# Patient Record
Sex: FEMALE | Race: WHITE
[De-identification: names, ages, dates, MRNs, and addresses within clinical notes are randomized per-mention and may not be internally consistent; named-entity substitution may affect disease eponyms.]

---

## 2018-07-25 ENCOUNTER — HOSPITAL ENCOUNTER (EMERGENCY)
Dept: HOSPITAL 65 - ER | Age: 33
Discharge: HOME | End: 2018-07-25
Payer: COMMERCIAL

## 2018-07-25 VITALS — BODY MASS INDEX: 46.65 KG/M2 | HEIGHT: 65 IN | WEIGHT: 280 LBS

## 2018-07-25 VITALS — SYSTOLIC BLOOD PRESSURE: 135 MMHG | DIASTOLIC BLOOD PRESSURE: 90 MMHG

## 2018-07-25 VITALS — DIASTOLIC BLOOD PRESSURE: 78 MMHG | SYSTOLIC BLOOD PRESSURE: 122 MMHG

## 2018-07-25 DIAGNOSIS — K52.9: Primary | ICD-10-CM

## 2018-07-25 DIAGNOSIS — E07.9: ICD-10-CM

## 2018-07-25 DIAGNOSIS — Z79.899: ICD-10-CM

## 2018-07-25 PROCEDURE — 99283 EMERGENCY DEPT VISIT LOW MDM: CPT

## 2018-07-25 NOTE — ER.PDOC
General


Chief Complaint:  Nausea,Vomiting,Diarrhea


Stated Complaint:  N/V/D


Time seen by MD:  12:47


Source:  patient


Exam Limitations:  no limitations





History of Present Illness


Initial Comments


Nausea/vomiting/diarrhea this morning. Diarrhea x4 with occasional abdominal 

cramps.


Severity/Quality:  mild, cramping


Associated Symptoms (vomiting):  mild vomiting


Associated Symptoms (diarrhea):  watery


Allergies:  


Coded Allergies:  


     No Known Allergies (Unverified , 16)


Home Meds


Active Scripts


Hydrocodone Bit/Acetaminophen (NORCO 7.5-325 TABLET) 7.5-3,251 Ea Tablet, 2 

EACH PO Q4H PRN for Pain 7-10 when tolerating PO, #60 TABLET 0 Refills


   Prov:SPRING MCDUFFIE MD         16


Reported Medications


Pantoprazole Sodium (PROTONIX) 40 Mg Tablet.dr, 1 TAB PO DAILY, #30 TAB 5 

Refills


   11/8/15


Levothyroxine Sodium (SYNTHROID) 175 Mcg Tablet, 175 MCG PO DAILY for 

HYPOTHYROID, #30 TABLET 1 Refill


   14


Vital Signs





First Vital Signs








  Date Time  Temp Pulse Resp B/P (MAP) Pulse Ox O2 Delivery O2 Flow Rate FiO2


 


18 12:30 97.6 87 17  100 Room Air  





 97.6       


 


18 12:33    135/90 (105)    








Last Vital Signs








  Date Time  Temp Pulse Resp B/P (MAP) Pulse Ox O2 Delivery O2 Flow Rate FiO2


 


18 12:33 97.6 92 17 135/90 (105) 100 Room Air  





 97.6       











Past Medical History


Medical History:  thyroid disease


Surgical History:  


LMP (females 10-50):  this week





Social History


Smoking:  non-smoker


Alcohol Use:  none


Drug Use:  none





Constitutional:  no symptoms reported


EENTM:  no symptoms reported


Respiratory:  no symptoms reported


Cardiovascular:  no symptoms reported


Gastrointestinal:  see HPI


Genitourinary:  no symptoms reported


All Other Systems:  Reviewed and Negative





Physical Exam


General Appearance:  No Apparent Distress, WD/WN


HEENT:  PERRL/EOMI, Normal ENT Inspection, TMs Normal, Pharynx Normal


Neck:  Non-Tender, Full Range of Motion, Supple, Normal Inspection


Respiratory:  chest non-tender, lungs clear, normal breath sounds, no 

respiratory distress, no accessory muscle use


Cardiovascular:  Normal Peripheral Pulses, Regular Rate, Rhythm, No Edema, No 

Gallop, No JVD, No Murmur


Gastrointestinal:  Normal Bowel Sounds, Non Tender, Soft


Back:  Normal Inspection, No CVA Tenderness, No Vertebral Tenderness


Extremities:  Normal Range of Motion, Non-Tender, Normal Inspection, No Pedal 

Edema, No Calf Tenderness, Normal Capillary Refill, Pelvis Stable


Neurologic/Psychiatric:  CNs II-XII NML as Tested, No Motor/Sensory Deficits, 

Alert, Normal Mood/Affect, Oriented x 3


Skin:  Normal Color, Warm/Dry


Lymphatic:  No Adenopathy





Departure


Time of Disposition:  12:48


Disposition:  01 HOME, SELF-CARE


Impression:  


 Primary Impression:  


 Gastroenteritis


Condition:  Improved


Referrals:  


YUDITH GONSALVES MD (PCP)


PRIMARY CARE PROVIDER





Additional Instructions:  


Phenergan


Imodium


Start feeding with clear liquids and advance diet as tolerated.


F/U with your PCP in 2-3 days


Duration or Time Spent with Pa:  30 mins











JASMINA DALE MD 2018 12:50

## 2018-07-28 ENCOUNTER — HOSPITAL ENCOUNTER (EMERGENCY)
Dept: HOSPITAL 65 - ER | Age: 33
Discharge: HOME | End: 2018-07-28
Payer: COMMERCIAL

## 2018-07-28 VITALS — SYSTOLIC BLOOD PRESSURE: 129 MMHG | DIASTOLIC BLOOD PRESSURE: 62 MMHG

## 2018-07-28 VITALS — DIASTOLIC BLOOD PRESSURE: 62 MMHG | SYSTOLIC BLOOD PRESSURE: 129 MMHG

## 2018-07-28 DIAGNOSIS — K52.9: Primary | ICD-10-CM

## 2018-07-28 DIAGNOSIS — R79.1: ICD-10-CM

## 2018-07-28 DIAGNOSIS — Z79.899: ICD-10-CM

## 2018-07-28 LAB
ALP INTEST CFR SERPL: 88 U/L (ref 50–136)
ALT SERPL-CCNC: 28 U/L (ref 12–78)
APPEARANCE UR: CLEAR
AST SERPL-CCNC: 16 U/L (ref 0–35)
BASOPHILS # BLD AUTO: 0 10^3/UL (ref 0–0.1)
BASOPHILS NFR BLD AUTO: 0.3 % (ref 0–0.2)
BILIRUB UR STRIP.AUTO-MCNC: NEGATIVE MG/DL
CALCIUM SERPL-MCNC: 9.5 MG/DL (ref 8.4–10.5)
CO2 BLDA-SCNC: 28.4 MMOL/L (ref 20–32)
COLOR UR: YELLOW
EOSINOPHIL # BLD AUTO: 0.1 10^3/UL (ref 0–0.2)
EOSINOPHIL NFR BLD AUTO: 1.8 % (ref 0–5)
ERYTHROCYTE [DISTWIDTH] IN BLOOD BY AUTOMATED COUNT: 17 % (ref 11.5–14.5)
GLUCOSE PRE 100 G GLC PO SERPL-MCNC: 133 MG/DL (ref 70–110)
HGB BLD-MCNC: 12.1 G/DL (ref 12–15)
LYMPHOCYTES # BLD AUTO: 1.1 10^3/UL (ref 1–4.8)
LYMPHOCYTES NFR BLD AUTO: 15.8 % (ref 24–44)
Lab: NEGATIVE
MCH RBC QN AUTO: 23.5 PG (ref 26–34)
MCHC RBC AUTO-ENTMCNC: 31.3 G/DL (ref 33–37)
MCV RBC AUTO: 75.1 FL (ref 78–100)
MONOCYTES # BLD AUTO: 0.4 10^3/UL (ref 0.3–0.8)
MONOCYTES NFR BLD AUTO: 5.1 % (ref 5–12)
NEUTROPHILS # BLD AUTO: 5.5 10^3/UL (ref 1.8–7.7)
NEUTROPHILS NFR BLD AUTO: 77 % (ref 41–85)
PLATELET # BLD AUTO: 350 10^3/UL (ref 150–400)
PMV BLD AUTO: 10.8 FL (ref 7.8–11)
UROBILINOGEN UR QL STRIP.AUTO: NORMAL
WBC # BLD AUTO: 7.2 10^3/UL (ref 4.5–11)

## 2018-07-28 PROCEDURE — 86677 HELICOBACTER PYLORI ANTIBODY: CPT

## 2018-07-28 PROCEDURE — 85730 THROMBOPLASTIN TIME PARTIAL: CPT

## 2018-07-28 PROCEDURE — 99284 EMERGENCY DEPT VISIT MOD MDM: CPT

## 2018-07-28 PROCEDURE — 81002 URINALYSIS NONAUTO W/O SCOPE: CPT

## 2018-07-28 PROCEDURE — 80053 COMPREHEN METABOLIC PANEL: CPT

## 2018-07-28 PROCEDURE — 96372 THER/PROPH/DIAG INJ SC/IM: CPT

## 2018-07-28 PROCEDURE — 85025 COMPLETE CBC W/AUTO DIFF WBC: CPT

## 2018-07-28 PROCEDURE — 82150 ASSAY OF AMYLASE: CPT

## 2018-07-28 PROCEDURE — 85610 PROTHROMBIN TIME: CPT

## 2018-07-28 PROCEDURE — 84703 CHORIONIC GONADOTROPIN ASSAY: CPT

## 2018-07-28 PROCEDURE — 36415 COLL VENOUS BLD VENIPUNCTURE: CPT

## 2018-07-28 PROCEDURE — 83690 ASSAY OF LIPASE: CPT

## 2018-07-28 NOTE — NUR
Arrival



Patient comes to ER with the complaint of nausea vomiting and diahrea. Patient 
states that she was seen in our ER on Wednesday with the same complaint but has 
not felt better since she has left. Patient says that she was given phenergran 
and imodium but did not have the money to get the medication filled at that 
time. Patient is laying on gurny in stable condition at this time.

## 2018-07-28 NOTE — ER.PDOC
General


Chief Complaint:  Requesting Medical Care


Stated Complaint:  NAUSEA,VOMITING,DIARRHEA


Time seen by MD:  09:00


Source:  patient


Exam Limitations:  no limitations





History of Present Illness


Severity/Quality:  mild


Associated Symptoms (diarrhea):  mild, watery


Prior symptoms/Treatment:  Similar symptoms previous, Recenly Seen, Treated by 

Doctor (non compliance )


Allergies:  


Coded Allergies:  


     No Known Allergies (Unverified , 16)


Home Meds


Active Scripts


Hydrocodone Bit/Acetaminophen (NORCO 7.5-325 TABLET) 7.5-3,251 Ea Tablet, 2 

EACH PO Q4H PRN for Pain 7-10 when tolerating PO, #60 TABLET 0 Refills


   Prov:SPRING MCDUFFIE MD         16


Reported Medications


Pantoprazole Sodium (PROTONIX) 40 Mg Tablet.dr, 1 TAB PO DAILY, #30 TAB 5 

Refills


   11/8/15


Levothyroxine Sodium (SYNTHROID) 175 Mcg Tablet, 175 MCG PO DAILY for 

HYPOTHYROID, #30 TABLET 1 Refill


   14


Vital Signs





First Vital Signs








  Date Time  Temp Pulse Resp B/P (MAP) Pulse Ox O2 Delivery O2 Flow Rate FiO2


 


18 13:12  92      


 


18 09:12 97.6  20  99 Room Air  





 97.6       


 


18 09:15    129/62 (84)    








Last Vital Signs








  Date Time  Temp Pulse Resp B/P (MAP) Pulse Ox O2 Delivery O2 Flow Rate FiO2


 


18 09:54  80 18 129/62 (84) 97 Room Air  


 


18 09:15 97.6       





 97.6       











Past Medical History


Medical History:  no pertinent history


Surgical History:  





Family History


Significant Family History:  no pertinent family hx





Social History


Smoking:  non-smoker


Alcohol Use:  none


Drug Use:  none





Reviewed


Nursing Reviewed:  Vital Signs, Abn. Noted





All Other Systems:  Reviewed and Negative





Physical Exam


General Appearance:  No Apparent Distress, WD/WN


HEENT:  PERRL/EOMI, Normal ENT Inspection, TMs Normal, Pharynx Normal


Neck:  Non-Tender, Full Range of Motion, Supple, Normal Inspection


Respiratory:  chest non-tender, lungs clear, normal breath sounds, no 

respiratory distress, no accessory muscle use


Gastrointestinal:  Normal Bowel Sounds, Non Tender, Soft


Back:  Normal Inspection, No CVA Tenderness, No Vertebral Tenderness


Extremities:  Normal Range of Motion, Non-Tender, Normal Inspection, No Pedal 

Edema, No Calf Tenderness, Normal Capillary Refill, Pelvis Stable


Neurologic/Psychiatric:  CNs II-XII NML as Tested, No Motor/Sensory Deficits, 

Alert, Normal Mood/Affect, Oriented x 3


Skin:  Normal Color, Warm/Dry


Lymphatic:  No Adenopathy





Results/Orders


Results/Orders





Laboratory Tests








Test


 18


09:20


 


White Blood Count


 7.2 10^3/uL


(4.5-11.0)


 


Red Blood Count


 5.14 10^6/uL


(4.00-5.20)


 


Hemoglobin


 12.1 g/dL


(12.0-15.0)


 


Hematocrit


 38.6 %


(36.0-46.0)


 


Mean Corpuscular Volume


 75.1 fL


()


 


Mean Corpuscular Hemoglobin


 23.5 pg


(26-34)


 


Mean Corpuscular Hemoglobin


Concent 31.3 g/dL


(33-37)


 


Red Cell Distribution Width


 17.0 %


(11.5-14.5)


 


Platelet Count


 350 10^3/uL


(150-400)


 


Mean Platelet Volume


 10.8 fL


(7.8-11.0)


 


Neutrophils (%) (Auto)


 77.0 %


(41.0-85.0)


 


Lymphocytes (%) (Auto)


 15.8 %


(24.0-44.0)


 


Monocytes (%) (Auto)


 5.1 %


(5.0-12.0)


 


Neutrophils # (Auto)


 5.5 10^3/uL


(1.8-7.7)


 


Lymphocytes # (Auto)


 1.1 10^3/uL


(1.0-4.8)


 


Monocytes # (Auto)


 0.4 10^3/uL


(0.3-0.8)


 


Absolute Immature Granulocyte


(auto 0 10^3 u/L


(0-2)


 


Eosinophils %


 1.8 %


(0.0-5.0)


 


Basophils %


 0.3 %


(0.0-0.2)


 


Basophils #


 0.0 10^3/uL


(0.0-0.1)


 


Eosinophil Count


 0.1 10^3/uL


(0.0-0.2)


 


Prothrombin Time


 9.6 SEC


(9.8-11.9)


 


Prothrombin Time INR


(Non-Therap) 1.0 





 


Activated Partial


Thromboplast Time 27.6 SEC


(24.67-30.72)


 


Urine Collection Type CCMS 


 


Urine Color


 YELLOW


(YELLOW)


 


Urine Appearance CLEAR (CLEAR) 


 


Urine Bilirubin


 NEGATIVE MG/DL


(NEGATIVE)


 


Urine Ketones


 NEGATIVE


(NEGATIVE)


 


Urine Specific Gravity


 1.005


(1.005-1.035)


 


Urine pH 6 (5.0-6.0) 


 


Urine Protein


 NEGATIVE


(NEGATIVE)


 


Urine Urobilinogen


 NORMAL


(NEGATIVE)


 


Urine Nitrate


 NEGATIVE


(NEGATIVE)


 


Urine Leukocyte Esterase


 NEGATIVE


(NEGATIVE)


 


Urine Blood


 NEGATIVE


(NEGATIVE)


 


Urine Glucose


 NORMAL


(NEGATIVE)


 


Sodium Level


 139 mmol/L


(132-145)


 


Potassium Level


 4.7 mmol/L


(3.6-5.2)


 


Chloride Level


 103.0 mmol/L


()


 


Carbon Dioxide Level


 28.4 mmol/L


(20.0-32)


 


Anion Gap 12.3 


 


Blood Urea Nitrogen


 10 mg/dL


(7-18)


 


Creatinine


 0.98 mg/dL


(0.59-1.40)


 


Estimated GFR (


American) 79.1 (>/=60) 





 


BUN/Creatinine Ratio 10.0 


 


Glucose Level


 133 mg/dL


()


 


Calcium Level


 9.5 mg/dL


(8.4-10.5)


 


Total Bilirubin


 0.2 mg/dL


(0.2-1.0)


 


Aspartate Amino Transf


(AST/SGOT) 16 U/L (0-35) 





 


Alanine Aminotransferase


(ALT/SGPT) 28 U/L (12-78) 





 


Alkaline Phosphatase


 88 U/L


()


 


Total Protein


 8.1 g/dL


(6.4-8.2)


 


Albumin


 3.7 g/dL


(3.4-5.0)


 


Globulin 4.4 


 


Amylase Level


 44 U/L


()


 


Lipase


 173 U/L


(114-286)


 


Serum HCG, Qualitative


 NEGATIVE


(NEGATIVE)


 


Percent Immature Gran (Cell


Imm) 0.00 %


(0.00-0.50)


 


Helicobacter pylori Screen


 POSITIVE


(NEGATIVE)








Administered Medications








 Medications


  (Trade)  Dose


 Ordered  Sig/Brit


 Route


 PRN Reason  Start Time


 Stop Time Status Last Admin


Dose Admin


 


 Ondansetron HCl


  (Zofran Odt)  4 mg  STAT  STAT


 SL


   18 09:07


 18 09:09 DC 18 09:16





 


 Diphenoxylate HCl/


 Atropine


  (Lomotil)  2 each  STAT  STAT


 PO


   18 09:07


 18 09:09 DC 18 09:16





 


 Promethazine HCl


  (Phenergan)  25 mg  STAT  STAT


 IM


   18 09:45


 18 09:48 DC 18 09:53














Departure


Time of Disposition:  10:00


Disposition:  01 HOME, SELF-CARE


Impression:  


 Primary Impression:  


 Gastroenteritis


Condition:  Improved


Referrals:  


YUDITH GONSALVES MD (PCP)


PRIMARY CARE PROVIDER


Duration or Time Spent with Pa:  1 hr











RENA HDZ MD 2018 09:20

## 2018-10-03 ENCOUNTER — HOSPITAL ENCOUNTER (EMERGENCY)
Dept: HOSPITAL 65 - ER | Age: 33
Discharge: HOME | End: 2018-10-03
Payer: COMMERCIAL

## 2018-10-03 VITALS — SYSTOLIC BLOOD PRESSURE: 145 MMHG | DIASTOLIC BLOOD PRESSURE: 101 MMHG

## 2018-10-03 VITALS — SYSTOLIC BLOOD PRESSURE: 125 MMHG | DIASTOLIC BLOOD PRESSURE: 83 MMHG

## 2018-10-03 VITALS — WEIGHT: 279 LBS | BODY MASS INDEX: 46.48 KG/M2 | HEIGHT: 65 IN

## 2018-10-03 DIAGNOSIS — Z79.899: ICD-10-CM

## 2018-10-03 DIAGNOSIS — J30.9: Primary | ICD-10-CM

## 2018-10-03 PROCEDURE — 99282 EMERGENCY DEPT VISIT SF MDM: CPT

## 2018-10-03 NOTE — ER.PDOC
General


Chief Complaint:  General Complaint


Stated Complaint:  HEAD ACHE, HIGH BLOOD PRESSURE


Time seen by MD:  10:15


Exam Limitations:  no limitations





History of Present Illness


Initial Comments


Pt c/o frontal HA x 4 days-she has taken advil and tylenol and each relieves HA 

for a few hours but then it returns.  She chcked her BP


several times and was concerned that it was 130-140 systolic, higher than her 

usual systolic of 120-130.  She denies any chest pain or SOB or blurred or


double vision.


Timing/Duration:  other (4 days)


Severity/Quality:  moderate


Prior Headaches/Recent Trauma:  occasional headaches


Associated Symptoms:  denies symptoms


Allergies:  


Coded Allergies:  


     No Known Allergies (Unverified , 16)


Home Meds


Active Scripts


Hydrocodone Bit/Acetaminophen (NORCO 7.5-325 TABLET) 7.5-3,251 Ea Tablet, 2 

EACH PO Q4H PRN for Pain 7-10 when tolerating PO, #60 TABLET 0 Refills


   Prov:SPRING MCDUFFIE MD         16


Reported Medications


Pantoprazole Sodium (PROTONIX) 40 Mg Tablet.dr, 1 TAB PO DAILY, #30 TAB 5 

Refills


   11/8/15


Levothyroxine Sodium (SYNTHROID) 175 Mcg Tablet, 175 MCG PO DAILY for 

HYPOTHYROID, #30 TABLET 1 Refill


   14





Past Medical History


Surgical History:  


LMP (females 10-50):  last week





Social History


Smoking:  non-smoker


Alcohol Use:  none


Drug Use:  none





Reviewed


Nursing Reviewed:  Vital Signs, Abn. Noted, Nursing Assessment





Review of Systems


Constitutional:  denies no symptoms reported, denies see HPI, denies chills, 

denies diaphoresis, denies fever, denies malaise, denies weakness, denies other


Eyes:  denies no symptoms reported, denies see HPI, denies blindness, denies 

blurred vision, denies drainage, denies decreased acuity, denies foreign body 

sensation, denies inflammation, denies pain, denies photophobia, denies 

previous injury, denies shadows, denies tunnel vision, denies vision change, 

denies contact lenses, denies glasses, denies other


Ears, Nose, Mouth, Throat:  denies no symptoms reported, denies see HPI, denies 

ear pain, denies ear discharge, denies nose pain, denies nose discharge, denies 

epistaxis, denies mouth pain, denies mouth swelling, denies loose teeth, denies 

throat pain, denies throat swelling


Respiratory:  denies no symptoms reported, denies see HPI, denies cough, denies 

orthopnea, denies shortness of breath, denies stridor, denies wheezing, denies 

other


Cardiovascular:  denies no symptoms reported, denies see HPI, denies chest pain

, denies edema, denies palpitations, denies syncope, denies other


Gastrointestinal:  denies no symptoms reported, denies see HPI, denies 

abdominal pain, denies constipation, denies diarrhea, denies nausea, denies 

vomiting, denies other


Genitourinary:  denies no symptoms reported, denies see HPI, denies discharge, 

denies dysuria, denies frequency, denies hematuria, denies pain, denies other


Musculoskeletal:  denies no symptoms reported, denies see HPI, denies back pain

, denies gout, denies joint pain, denies joint swelling, denies muscle pain, 

denies muscle stiffness, denies neck pain, denies other


Psychiatric/Neurological:  denies no symptoms reported, denies see HPI, denies 

anxiety, denies depressed, denies emotional problems; headache; denies numbness

, denies paresthesia, denies pre-existing deficit, denies seizure, denies 

tingling, denies tremors, denies weakness, denies other


All Other Systems:  Reviewed and Negative





Physical Exam


General Appearance:  No Apparent Distress, WD/WN


Head/Eyes:  eyes nml inspection, no facial swelling, no nystagmus, PERRL


ENT:  pharynx nml (nasal mucosa erythematous with edsema bilaterally- almost 

occluding nares bilaterally; sinuses nontender bilat)


Neck:  nml inspection, Supple


Cardiovascular:  Normal Peripheral Pulses, Regular Rate, Rhythm, No Edema, No 

Gallop, No JVD, No Murmur


Respiratory:  chest non-tender, lungs clear, normal breath sounds, no 

respiratory distress, no accessory muscle use


Gastrointestinal:  Normal Bowel Sounds, No Organomegaly, No Pulsatile Mass, Non 

Tender, Soft


Back:  Normal Inspection, No CVA Tenderness, No Vertebral Tenderness


Extremities:  Normal Range of Motion, Non-Tender, Normal Inspection, No Pedal 

Edema, No Calf Tenderness, Normal Capillary Refill


Psychiatric:  Alert, Oriented x 3


Cranial Nerves:  Normal Hearing, Normal Speech, PERRL


Coordination/Gait:  Normal Gait


Motor/Sensory:  No Motor Deficit, No Sensory Deficit


Skin:  Warm/Dry, Normal Color


Lymphatic:  No Adenopathy





Departure


Time of Disposition:  10:37


Disposition:  01 HOME, SELF-CARE


Impression:  


 Primary Impression:  


 Rhinitis, allergic


Condition:  Stable


Patient Instructions:  Allergic Rhinitis


Referrals:  


YUDITH GONSALVES MD (PCP)


PRIMARY CARE PROVIDER


Comments


rx zyrtec 10 mg po q 12 hr prn allergies #60


Duration or Time Spent with Pa:  15











PALOMA COLMENARES MD Oct 3, 2018 10:40

## 2019-08-24 ENCOUNTER — HOSPITAL ENCOUNTER (EMERGENCY)
Dept: HOSPITAL 65 - ER | Age: 34
Discharge: HOME | End: 2019-08-24
Payer: COMMERCIAL

## 2019-08-24 VITALS — WEIGHT: 269 LBS | HEIGHT: 65 IN | BODY MASS INDEX: 44.82 KG/M2

## 2019-08-24 VITALS — SYSTOLIC BLOOD PRESSURE: 141 MMHG | DIASTOLIC BLOOD PRESSURE: 77 MMHG

## 2019-08-24 VITALS — DIASTOLIC BLOOD PRESSURE: 54 MMHG | SYSTOLIC BLOOD PRESSURE: 115 MMHG

## 2019-08-24 VITALS — SYSTOLIC BLOOD PRESSURE: 115 MMHG | DIASTOLIC BLOOD PRESSURE: 54 MMHG

## 2019-08-24 DIAGNOSIS — E07.9: ICD-10-CM

## 2019-08-24 DIAGNOSIS — Z79.899: ICD-10-CM

## 2019-08-24 DIAGNOSIS — H81.10: Primary | ICD-10-CM

## 2019-08-24 DIAGNOSIS — R79.1: ICD-10-CM

## 2019-08-24 DIAGNOSIS — R11.2: ICD-10-CM

## 2019-08-24 DIAGNOSIS — Z79.891: ICD-10-CM

## 2019-08-24 LAB
ALP INTEST CFR SERPL: 78 U/L (ref 50–136)
ALT SERPL-CCNC: 17 U/L (ref 12–78)
ANISOCYTOSIS BLD QL SMEAR: (no result)
AST SERPL-CCNC: 15 U/L (ref 0–35)
BASOPHILS # BLD AUTO: 0 10^3/UL (ref 0–0.1)
BASOPHILS NFR BLD AUTO: 0.3 % (ref 0–0.2)
CALCIUM SERPL-MCNC: 8.6 MG/DL (ref 8.4–10.5)
CO2 BLDA-SCNC: 26.7 MMOL/L (ref 20–32)
EOSINOPHIL # BLD AUTO: 0.1 10^3/UL (ref 0–0.2)
EOSINOPHIL NFR BLD AUTO: 1.7 % (ref 0–5)
ERYTHROCYTE [DISTWIDTH] IN BLOOD BY AUTOMATED COUNT: 17.8 % (ref 11.5–14.5)
GLUCOSE PRE 100 G GLC PO SERPL-MCNC: 114 MG/DL (ref 70–110)
HGB BLD-MCNC: 10.3 G/DL (ref 12–15)
LYMPHOCYTES # BLD AUTO: 1.6 10^3/UL (ref 1–4.8)
LYMPHOCYTES NFR BLD AUTO: 21.2 % (ref 24–44)
MCH RBC QN AUTO: 21.4 PG (ref 26–34)
MCHC RBC AUTO-ENTMCNC: 30 G/DL (ref 33–37)
MCV RBC AUTO: 71.2 FL (ref 78–100)
MICROCYTES BLD QL SMEAR: (no result)
MONOCYTES # BLD AUTO: 0.4 10^3/UL (ref 0.3–0.8)
MONOCYTES NFR BLD AUTO: 5.5 % (ref 5–12)
NEUTROPHILS # BLD AUTO: 5.4 10^3/UL (ref 1.8–7.7)
NEUTROPHILS NFR BLD AUTO: 71.3 % (ref 41–85)
PLATELET # BLD AUTO: 366 10^3/UL (ref 150–400)
PMV BLD AUTO: 10.5 FL (ref 7.8–11)
WBC # BLD AUTO: 7.6 10^3/UL (ref 4.5–11)

## 2019-08-24 PROCEDURE — 84484 ASSAY OF TROPONIN QUANT: CPT

## 2019-08-24 PROCEDURE — 80053 COMPREHEN METABOLIC PANEL: CPT

## 2019-08-24 PROCEDURE — 85730 THROMBOPLASTIN TIME PARTIAL: CPT

## 2019-08-24 PROCEDURE — 85379 FIBRIN DEGRADATION QUANT: CPT

## 2019-08-24 PROCEDURE — 99285 EMERGENCY DEPT VISIT HI MDM: CPT

## 2019-08-24 PROCEDURE — 86677 HELICOBACTER PYLORI ANTIBODY: CPT

## 2019-08-24 PROCEDURE — 83880 ASSAY OF NATRIURETIC PEPTIDE: CPT

## 2019-08-24 PROCEDURE — 82550 ASSAY OF CK (CPK): CPT

## 2019-08-24 PROCEDURE — 85610 PROTHROMBIN TIME: CPT

## 2019-08-24 PROCEDURE — 36415 COLL VENOUS BLD VENIPUNCTURE: CPT

## 2019-08-24 PROCEDURE — 85025 COMPLETE CBC W/AUTO DIFF WBC: CPT

## 2019-08-24 PROCEDURE — 93005 ELECTROCARDIOGRAM TRACING: CPT

## 2019-08-24 NOTE — ER.PDOC
General


Chief Complaint:  General Complaint


Stated Complaint:  FAINTED/ DIZZINESS


Time seen by MD:  15:33


Source:  patient


Exam Limitations:  no limitations





History of Present Illness


Occurred:  just prior to arrival


Associated Symptoms:  nausea/vomiting, sense of movement, spinning, falling


Decreased Ability to Stand:  off balance


Usually:  walks w/o assistance


Worsened By:  changing position, movement of head


Prior symptoms/Treatment:  Similar symptoms previous


Allergies:  


Coded Allergies:  


     No Known Allergies (Unverified , 16)


Home Meds


Active Scripts


Hydrocodone Bit/Acetaminophen (NORCO 7.5-325 TABLET) 7.5-3,251 Ea Tablet, 2 EACH

PO Q4H PRN for Pain 7-10 when tolerating PO, #60 TABLET 0 Refills


   Prov:SPRING MCDUFFIE MD         16


Reported Medications


Pantoprazole Sodium (PROTONIX) 40 Mg Tablet.dr, 1 TAB PO DAILY, #30 TAB 5 

Refills


   11/8/15


Levothyroxine Sodium (SYNTHROID) 175 Mcg Tablet, 175 MCG PO DAILY for 

HYPOTHYROID, #30 TABLET 1 Refill


   14





Past Medical History


Medical History:  thyroid disease


Surgical History:  





Social History


Smoking:  non-smoker


Alcohol Use:  none


Drug Use:  none





Reviewed


Nursing Reviewed:  Vital Signs, Abn. Noted





Review of Systems


All Other Systems:  Reviewed and Negative





Physical Exam


General Appearance:  alert, no distress


EENT:  nml eye inspection, PERRL, no nystagmus, nml ENT inspection, pharynx nml,

TM's nml


Neck:  supple


Respiratory:  no resp distress, breath sounds nml


CVS:  reg rate & rhythm, heart sounds.nml


Abdomen:  non-tender, no organomegaly, no distention


Skin:  color nml, no rash, warm/dry


Extremities:  non-tender, nml ROM, no pedal edema


Neuro/Psych:  nml orientation, nml speech/cognition, nml mood/affect


Cranial Nerves:  nml as tested, no evidence of acute CVA


Cerebellar:  nml as tested


Sensorimotor:  nml motor, nml sensation





Results/Orders


Results/Orders





Orders - RENA HDZ MD


Cbc With Auto Diff (19 15:24)


Comprehensive Metabolic Panel (19 15:24)


Creatine Kinase (19 15:24)


Troponin I (19 15:24)


Probnp    B-Type Np (19 15:24)


PT (19 15:24)


Partial Thromboplastin Time. (19 15:24)


Helicobacter Pylori (19 15:24)


D-Dimer (19 15:24)


Ekg-Routine (19 15:24)


Meclizine Hcl (Antivert) (19 15:24)





Vital Signs








  Date Time  Temp Pulse Resp B/P (MAP) Pulse Ox O2 Delivery O2 Flow Rate FiO2


 


19 16:17 97.5 81 18 115/54 (74) 99 Room Air  


 


19 15:12 97.5 95 18 141/77 (98) 99 Room Air  


 


19 15:10 97.5 95 18  99 Room Air  


 


19 15:09 97.5 95 18     








Administered Medications








 Medications


  (Trade)  Dose


 Ordered  Sig/Brit


 Route


 PRN Reason  Start Time


 Stop Time Status Last Admin


Dose Admin


 


 Meclizine HCl


  (Antivert)  25 mg  STAT  STAT


 PO


   19 15:24


 19 15:26 DC 19 15:30


25 MG








                                Laboratory Tests








Test


 19


15:38


 


White Blood Count


 7.6 10^3/uL


(4.5-11.0)


 


Red Blood Count


 4.82 10^6/uL


(4.00-5.20)


 


Hemoglobin


 10.3 g/dL


(12.0-15.0)  L


 


Hematocrit


 34.3 %


(36.0-46.0)  L


 


Mean Corpuscular Volume


 71.2 fL


()  L


 


Mean Corpuscular Hemoglobin


 21.4 pg


(26-34)  L


 


Mean Corpuscular Hemoglobin


Concent 30.0 g/dL


(33-37)  L


 


Red Cell Distribution Width


 17.8 %


(11.5-14.5)  H


 


Platelet Count


 366 10^3/uL


(150-400)


 


Mean Platelet Volume


 10.5 fL


(7.8-11.0)


 


Neutrophils (%) (Auto)


 71.3 %


(41.0-85.0)


 


Lymphocytes (%) (Auto)


 21.2 %


(24.0-44.0)  L


 


Monocytes (%) (Auto)


 5.5 %


(5.0-12.0)


 


Neutrophils # (Auto)


 5.4 10^3/uL


(1.8-7.7)


 


Lymphocytes # (Auto)


 1.6 10^3/uL


(1.0-4.8)


 


Monocytes # (Auto)


 0.4 10^3/uL


(0.3-0.8)


 


Absolute Immature Granulocyte


(auto 0 10^3 u/L


(0-2)


 


Immature Granulocytes %


 0.00 %


(0.00-0.50)


 


Eosinophils %


 1.7 %


(0.0-5.0)


 


Basophils %


 0.3 %


(0.0-0.2)  H


 


Basophils #


 0.0 10^3/uL


(0.0-0.1)


 


Eosinophil Count


 0.1 10^3/uL


(0.0-0.2)


 


Prothrombin Time


 10.1 SEC


(9.4-11.5)


 


Prothrombin Time INR


(Non-Therap) 1.0  





 


Activated Partial


Thromboplast Time 22.3 SEC


(24.67-30.72)


 


D-Dimer


 0.48 mg/L


(0.19-0.49)


 


Sodium Level


 140 mmol/L


(132-145)


 


Potassium Level


 3.8 mmol/L


(3.6-5.2)


 


Chloride Level


 106.0 mmol/L


()


 


Carbon Dioxide Level


 26.7 mmol/L


(20.0-32)


 


Anion Gap 11.1  


 


Blood Urea Nitrogen


 13 mg/dL


(7-18)


 


Creatinine


 0.82 mg/dL


(0.59-1.40)


 


Estimated GFR (


American) 96.6 (>/=60)  





 


BUN/Creatinine Ratio 15.0  


 


Glucose Level


 114 mg/dL


()  H


 


Calcium Level


 8.6 mg/dL


(8.4-10.5)


 


Total Bilirubin


 0.2 mg/dL


(0.2-1.0)


 


Aspartate Amino Transferase


(AST) 15 U/L (0-35)  





 


Alanine Aminotransferase (ALT)


 17 U/L (12-78)





 


Alkaline Phosphatase


 78 U/L


()


 


Total Creatine Kinase


 102 U/L


()


 


Troponin I


 < 0.02 ng/mL


(0.00-0.05)


 


Pro-B-Type Natriuretic Peptide


 90 pg/mL


(0-125)


 


Total Protein


 7.3 g/dL


(6.4-8.2)


 


Albumin


 3.3 g/dL


(3.4-5.0)  L


 


Globulin 4.0  


 


Helicobacter pylori Screen


 POSITIVE


(NEGATIVE)











EKG/XRAY/CT/US


EKG:  NSR, no ST T wave changes





Course


Sepsis Screening Results: Posi:  POSITIVE SEPSIS RISK


Duration or Total Time Spent w:  15


Vitals & review Data





Vital Sign - Last 24 Hours








 19





 15:09 15:10 15:12 16:17


 


Temp 97.5 97.5 97.5 97.5


 


Pulse 95 95 95 81


 


Resp 18 18 18 18


 


B/P (MAP)   141/77 (98) 115/54 (74)


 


Pulse Ox  99 99 99


 


O2 Delivery  Room Air Room Air Room Air








Laboratory Tests








Test


 19


15:38


 


White Blood Count 7.6 10^3/uL 


 


Red Blood Count 4.82 10^6/uL 


 


Hemoglobin 10.3 g/dL 


 


Hematocrit 34.3 % 


 


Mean Corpuscular Volume 71.2 fL 


 


Mean Corpuscular Hemoglobin 21.4 pg 


 


Mean Corpuscular Hemoglobin


Concent 30.0 g/dL 





 


Red Cell Distribution Width 17.8 % 


 


Platelet Count 366 10^3/uL 


 


Mean Platelet Volume 10.5 fL 


 


Neutrophils (%) (Auto) 71.3 % 


 


Lymphocytes (%) (Auto) 21.2 % 


 


Monocytes (%) (Auto) 5.5 % 


 


Neutrophils # (Auto) 5.4 10^3/uL 


 


Lymphocytes # (Auto) 1.6 10^3/uL 


 


Monocytes # (Auto) 0.4 10^3/uL 


 


Absolute Immature Granulocyte


(auto 0 10^3 u/L 





 


Immature Granulocytes % 0.00 % 


 


Eosinophils % 1.7 % 


 


Basophils % 0.3 % 


 


Basophils # 0.0 10^3/uL 


 


Eosinophil Count 0.1 10^3/uL 


 


Prothrombin Time 10.1 SEC 


 


Prothrombin Time INR


(Non-Therap) 1.0 





 


Activated Partial


Thromboplast Time 22.3 SEC 





 


D-Dimer 0.48 mg/L 


 


Sodium Level 140 mmol/L 


 


Potassium Level 3.8 mmol/L 


 


Chloride Level 106.0 mmol/L 


 


Carbon Dioxide Level 26.7 mmol/L 


 


Anion Gap 11.1 


 


Blood Urea Nitrogen 13 mg/dL 


 


Creatinine 0.82 mg/dL 


 


Estimated GFR (


American) 96.6 





 


BUN/Creatinine Ratio 15.0 


 


Glucose Level 114 mg/dL 


 


Calcium Level 8.6 mg/dL 


 


Total Bilirubin 0.2 mg/dL 


 


Aspartate Amino Transf


(AST/SGOT) 15 U/L 





 


Alanine Aminotransferase


(ALT/SGPT) 17 U/L 





 


Alkaline Phosphatase 78 U/L 


 


Total Creatine Kinase 102 U/L 


 


Troponin I < 0.02 ng/mL 


 


Pro-B-Type Natriuretic Peptide 90 pg/mL 


 


Total Protein 7.3 g/dL 


 


Albumin 3.3 g/dL 


 


Globulin 4.0 


 


Helicobacter pylori Screen POSITIVE 








Sepsis Infection Criteria Pres:  None


O2 Sat by Pulse Oximetry:  99





Departure


Time of Disposition:  16:33


Disposition:  01 HOME, SELF-CARE


Impression:  


   Primary Impression:  


   BPPV (benign paroxysmal positional vertigo)


Condition:  Improved


Referrals:  


YUDITH GONSALVES MD (PCP)


PRIMARY CARE PROVIDER


Duration or Time Spent with Pa:  20 min











RENA HDZ MD              Aug 24, 2019 15:26

## 2019-08-24 NOTE — PCM.EKG
[facility]

                                       

Test Date:    2019               Test Time:    15:45:36

Pat Name:     SUJATA LOPES            Department:   

Patient ID:   [id number]          Room:          

Gender:       F                        Technician:   ANA

:          1985               Requested By: RENA HDZ

Order Number: [id number]           Reading MD:     

                                 Measurements

Intervals                              Axis          

Rate:         85                       P:            36

HI:           136                      QRS:          29

QRSD:         92                       T:            29

QT:           386                                    

QTc:          459                                    

                           Interpretive Statements

Normal sinus rhythm

Normal ECG

No previous ECG available for comparison



Please click the below link to view image of tracing.

## 2019-08-24 NOTE — NUR
ARRIVAL

PATIENT ARRIVED TO ED6 AMBULATORY, C/O OF DIZZINESS AND NAUSEA THAT STARTED 
APPROX 40 MINUTES PTA, DIZZINESS CONTINUED, PATIENT CAME TO THE ED FOR 
EVALUATION AND TREAT.

## 2021-05-27 NOTE — NUR
ARRIVAL

PT ARRIVED AMBULATORY TO ER 6 C/O NAUSEA, VOMITING AND DIARRHEA ONSET THIS AM 
AT 0700. NO ABDOMINAL PAIN. NO ACUTE DISTRESS NOTED. EDP NOTIFIED OF PT 
ARRIVAL. Call placed to the patient per [facility] referral     Voice message left for patient to call back  Phone number and hours of business provided  This is the 1st attempt to reach the patient  Will defer per protocol

## 2021-11-27 ENCOUNTER — HOSPITAL ENCOUNTER (EMERGENCY)
Dept: HOSPITAL 65 - ER | Age: 36
Discharge: HOME | End: 2021-11-27
Payer: COMMERCIAL

## 2021-11-27 VITALS — HEIGHT: 65 IN | WEIGHT: 230 LBS | BODY MASS INDEX: 38.32 KG/M2

## 2021-11-27 VITALS — SYSTOLIC BLOOD PRESSURE: 159 MMHG | DIASTOLIC BLOOD PRESSURE: 103 MMHG

## 2021-11-27 DIAGNOSIS — M54.42: Primary | ICD-10-CM

## 2021-11-27 PROCEDURE — 99283 EMERGENCY DEPT VISIT LOW MDM: CPT

## 2021-11-27 PROCEDURE — 96372 THER/PROPH/DIAG INJ SC/IM: CPT

## 2021-11-27 NOTE — ER.PDOC
General


Chief Complaint:  Lower Back Pain or Injury


Stated Complaint:  LOWER BACK PAIN


Time seen by MD:  09:57


Source:  patient


Exam Limitations:  no limitations





History of Present Illness


Initial Comments


This is a 36-year-old female who comes to the emergency department with left-

sided sciatic pain.  She states she has had it off and on for the past 2 to 3 mo

nths but last night it worsened.  She denies any injury to this area she denies 

any incontinence of urine or stool.


Timing/Duration:  yesterday


Severity/Quality:  severe


Radiation:  buttocks, lower legs (The pain radiates into the left mid thigh)


Allergies:  


Coded Allergies:  


     No Known Allergies (Unverified , 16)


Home Meds


Active Scripts


Hydrocodone Bit/Acetaminophen (NORCO 7.5-325 TABLET) 7.5-3,251 Ea Tablet, 2 EACH

PO Q4H PRN for Pain 7-10 when tolerating PO, #60 TABLET 0 Refills


   Prov:SPRING MCDUFFIE MD         16


Reported Medications


Pantoprazole Sodium (PROTONIX) 40 Mg Tablet.dr, 1 TAB PO DAILY, #30 TAB 5 

Refills


   11/8/15


Levothyroxine Sodium (SYNTHROID) 175 Mcg Tablet, 175 MCG PO DAILY for 

HYPOTHYROID, #30 TABLET 1 Refill


   14





Past Medical History


Medical History:  thyroid disease, other


Surgical History:  





Social History


Alcohol Use:  none


Drug Use:  none





Review of Systems


Constitutional:  denies no symptoms reported, denies see HPI, denies chills, 

denies diaphoresis, denies fever, denies malaise, denies weakness, denies other


EENTM:  denies no symptoms reported, denies see HPI, denies eye pain, denies 

blurred vision, denies tearing, denies double vision, denies ear pain, denies 

ear discharge, denies nose pain, denies nose congestion, denies throat pain, 

denies throat swelling, denies mouth pain, denies mouth swelling, denies other


Respiratory:  denies no symptoms reported, denies see HPI, denies cough, denies 

orthopnea, denies shortness of breath, denies stridor, denies wheezing, denies 

other


Cardiovascular:  denies no symptoms reported, denies see HPI, denies chest pain,

denies edema, denies palpitations, denies syncope, denies other


Gastrointestinal:  denies no symptoms reported, denies see HPI, denies abdominal

pain, denies constipation, denies diarrhea, denies nausea, denies vomiting, 

denies other


Genitourinary:  denies no symptoms reported, denies see HPI, denies discharge, 

denies dysuria, denies frequency, denies hematuria, denies pain, denies other


Musculoskeletal:  denies no symptoms reported, denies see HPI; back pain; denies

gout, denies joint pain, denies joint swelling; muscle pain; denies muscle 

stiffness, denies neck pain, denies other


Skin:  denies no symptoms reported, denies see HPI, denies change in color, 

denies change in hair/nails, denies dryness, denies lesions, denies lumps, 

denies rash, denies other


Psychiatric/Neurological:  denies no symptoms reported, denies see HPI, denies 

anxiety, denies depressed, denies emotional problems, denies headache, denies 

numbness, denies paresthesia, denies pre-existing deficit, denies seizure, 

denies tingling, denies tremors, denies weakness, denies other


All Other Systems:  Reviewed and Negative





Physical Exam


General Appearance:  WD/WN, Moderate Distress


HEENT:  PERRL/EOMI, Normal ENT Inspection, TMs Normal, Pharynx Normal


Neck:  Non-Tender, Normal Alignment


Cardiovascular/Respiratory:  Regular Rate, Rhythm, No M/R/G, Normal Peripheral 

Pulses, No JVD, Normal Breath Sounds, No Respiratory Distress


Gastrointestinal:  Normal Bowel Sounds, No Organomegaly, No Pulsatile Mass, Non 

Tender, Soft


Back:  Normal Inspection, No CVA Tenderness, No Vertebral Tenderness


Extremities:  No Evidence of Injury, Normal Range of Motion, Non-Tender, No 

Pedal Edema, Pelvis Stable, Other (Tenderness to palpation over the left sciatic

exit area.)


Neuro/Psych:  Alert,  nml/symmetrical, mood/effect nml, No Motor/Sensory 

Deficits, Relexes nml


Skin:  Normal Color, Warm/Dry





Results/Orders


Results/Orders





Orders - ZAKIYA TYLER MD


Ketorolac Tromethamine (Toradol) (21 10:04)


Ketorolac Tromethamine (Toradol) (21 10:05)





Vital Signs








  Date Time  Temp Pulse Resp B/P (MAP) Pulse Ox O2 Delivery O2 Flow Rate FiO2


 


21 10:00 98.2 99 18  95   


 


21 10:00 98.2 99 18     


 


21 10:00 98.2 99 18 159/103 (121) 95 Room Air  











Progress


Progress


While in the emergency department, the patient received 30 mg of Toradol 

intramuscularly.





ER DEPART


Departure


Time of Disposition:  10:09


Disposition:  01 HOME / SELF CARE / HOMELESS


Impression:  


   Primary Impression:  


   Sciatica


Condition:  Stable


Patient Instructions:  Sciatica


Referrals:  


YUDITH GONSALVES MD (PCP)


PRIMARY CARE PROVIDER





Additional Instructions:  


Please ice to the area 4 times a day for 20 minutes each time.


Comments


Prescription for prednisone and tramadol


Duration or Time Spent with Pa:  Unknown





Problem Qualifiers








   Primary Impression:  


   Sciatica


   Laterality:  left  Qualified Codes:  M54.32 - Sciatica, left side








ZAKIYA TYLER MD            2021 10:07

## 2021-11-27 NOTE — NUR
ARRIVAL

PATIENT ARRIVED TO ED6 AMBULATORY, C/O BACK PAIN THE RADIATES DOWN HER RIGHT 
LEG, DOES HAVE A HISTORY OF BACK PAIN, DID TAKE IBUPROFEN PTA WITH MINIMAL 
RELIEF, CAME TO THE ED FOR EVAL, DOCTOR NAYELI TO THE ROOM TO SEE PATIENT.

## 2022-05-09 ENCOUNTER — HOSPITAL ENCOUNTER (EMERGENCY)
Dept: HOSPITAL 65 - ER | Age: 37
Discharge: HOME | End: 2022-05-09
Payer: COMMERCIAL

## 2022-05-09 VITALS — WEIGHT: 270 LBS | HEIGHT: 65 IN | BODY MASS INDEX: 44.98 KG/M2

## 2022-05-09 VITALS — DIASTOLIC BLOOD PRESSURE: 88 MMHG | SYSTOLIC BLOOD PRESSURE: 132 MMHG

## 2022-05-09 VITALS — SYSTOLIC BLOOD PRESSURE: 134 MMHG | DIASTOLIC BLOOD PRESSURE: 85 MMHG

## 2022-05-09 VITALS — SYSTOLIC BLOOD PRESSURE: 132 MMHG | DIASTOLIC BLOOD PRESSURE: 88 MMHG

## 2022-05-09 DIAGNOSIS — E07.9: ICD-10-CM

## 2022-05-09 DIAGNOSIS — J03.90: Primary | ICD-10-CM

## 2022-05-09 PROCEDURE — 99283 EMERGENCY DEPT VISIT LOW MDM: CPT

## 2022-05-09 NOTE — ER.PDOC
General


Chief Complaint:  Sore Throat


Stated Complaint:  SORE THROAT/EAR PAIN


Time seen by MD:  08:16


Source:  patient


Exam Limitations:  no limitations





History of Present Illness


Initial Comments


Sore throat and bilateral earache for 3 days.  No fever or chills.  No cough or 

runny nose.


Timing/Duration:  gradual


Associated Symptoms:  mod sore throat, earache (R), earache (L)


Severity:  moderate


Allergies:  


Coded Allergies:  


     No Known Allergies (Unverified , 16)


Home Meds


Active Scripts


Hydrocodone Bit/Acetaminophen (NORCO 7.5-325 TABLET) 7.5-3,251 Ea Tablet, 2 EACH

PO Q4H PRN for Pain 7-10 when tolerating PO, #60 TABLET 0 Refills


   Prov:SPRING MCDUFFIE MD         16


Reported Medications


Pantoprazole Sodium (PROTONIX) 40 Mg Tablet.dr, 1 TAB PO DAILY, #30 TAB 5 

Refills


   11/8/15


Levothyroxine Sodium (SYNTHROID) 175 Mcg Tablet, 175 MCG PO DAILY for 

HYPOTHYROID, #30 TABLET 1 Refill


   14





Past Medical History


Medical History:  thyroid disease, other


Surgical History:  





Family History


Significant Family History:  no pertinent family hx





Social History


Smoking:  non-smoker


Alcohol Use:  none


Drug Use:  none





Constitutional:  no symptoms reported


Ears:  see HPI


Throat:  see HPI


Respiratory:  no symptoms reported


Cardiovascular:  no symptoms reported


Gastrointestinal:  no symptoms reported


Musculoskeletal:  no symptoms reported


Skin:  no symptoms reported


All Other Systems:  Reviewed and Negative





Physical Exam


General Appearance:  alert, no distress


Head/Neck:  head nml inspection, neck nml inspection, trachea midline, no 

lymphadenopathy, thyroid nml


Eyes:  eyes nml inspection, PERRL, no nystagmus


Mouth:  lips, gums nml, no drooling, no thrush, membranes nml


Throat:  pharyngeal erythema, tonsillar exudate


Ears/Nose:  nml inspection


Respiratory:  no resp. distress, lungs clear


CVS:  reg. rate & rhythm, heart sounds nml


Abdomen:  non-tender, no organomegaly


Extremities:  non-tender, ROM nml


Skin Exam:  Normal Color, Warm/Dry


NEURO/PSYCH:  oriented X3, mood/effect nml





Results/Orders


Results/Orders





Vital Signs








  Date Time  Temp Pulse Resp B/P (MAP) Pulse Ox O2 Delivery O2 Flow Rate FiO2


 


22 08:02 98.1 113 22 132/88 (103) 98 Room Air* 0 21


 


22 07:53 98.1 113 22     


 


22 07:53 98.1 113 22 132/88 (103) 98 Room Air* 0 21


 


22 07:53 98.1 113 22  98   











ER DEPART


Departure


Time of Disposition:  08:19


Disposition:  01 HOME / SELF CARE / HOMELESS


Impression:  


   Primary Impression:  


   Acute tonsillitis


   Qualified Codes:  J03.90 - Acute tonsillitis, unspecified


   Additional Impression:  


   Acute pharyngitis


   Qualified Codes:  J02.9 - Acute pharyngitis, unspecified


Condition:  Stable


Referrals:  


YUDITH GONSALVES MD (PCP)


PRIMARY CARE PROVIDER





Additional Instructions:  


Amoxil


Prednisone


Chloraseptic spray over-the-counter as needed for throat pain


Follow-up with your PCP in 3 to 5 days


Return to ED if worsening symptoms or concerns


Duration or Time Spent with Pa:  10 min











JASMINA DALE MD                 May 9, 2022 08:21

## 2022-05-09 NOTE — NUR
ARRIVAL

PT AMBULATES TO ED6 WITH C/O SORE THROAT AND BILATERAL EARACHE THAT STARTED 
SATURDAY NIGHT. PT STATES THAT SHE HAS TAKEN IBUPROFEN WITH NO RELIEF. PT RATED 
PAIN 5/10, DESCRIBED AS SORENESS. PT TOOK AN AT HOME COVID TEST THAT CAME BACK 
NEGATIVE YESTERDAY. PTS AFEBRILE AT 98.1. ON INSPECTION THE PTS THROAT HAS 
REDNESS AND EDEMA NOTED. PTS VITALS OBTAINED.  NOTIFIED OF PTS ARRIVAL.

## 2022-09-21 ENCOUNTER — HOSPITAL ENCOUNTER (EMERGENCY)
Dept: HOSPITAL 65 - ER | Age: 37
Discharge: HOME | End: 2022-09-21
Payer: SELF-PAY

## 2022-09-21 VITALS
SYSTOLIC BLOOD PRESSURE: 158 MMHG | BODY MASS INDEX: 39.33 KG/M2 | WEIGHT: 230.38 LBS | HEIGHT: 64 IN | DIASTOLIC BLOOD PRESSURE: 88 MMHG

## 2022-09-21 VITALS — SYSTOLIC BLOOD PRESSURE: 153 MMHG | DIASTOLIC BLOOD PRESSURE: 66 MMHG

## 2022-09-21 DIAGNOSIS — Z98.890: ICD-10-CM

## 2022-09-21 DIAGNOSIS — Z87.440: ICD-10-CM

## 2022-09-21 DIAGNOSIS — E07.9: ICD-10-CM

## 2022-09-21 DIAGNOSIS — R31.9: ICD-10-CM

## 2022-09-21 DIAGNOSIS — N39.0: Primary | ICD-10-CM

## 2022-09-21 LAB
ANISOCYTOSIS BLD QL SMEAR: (no result)
BASOPHILS # BLD AUTO: 0.1 10^3/UL (ref 0–0.1)
BASOPHILS NFR BLD AUTO: 0.4 % (ref 0–0.2)
BILIRUB UR STRIP.AUTO-MCNC: NEGATIVE MG/DL
CO2 BLDA-SCNC: 23.3 MMOL/L (ref 20–32)
EOSINOPHIL # BLD AUTO: 0.1 10^3/UL (ref 0–0.2)
EOSINOPHIL NFR BLD AUTO: 0.7 % (ref 0–5)
ERYTHROCYTE [DISTWIDTH] IN BLOOD BY AUTOMATED COUNT: 28.1 % (ref 11.5–14.5)
GLUCOSE PRE 100 G GLC PO SERPL-MCNC: 96 MG/DL (ref 70–110)
LYMPHOCYTES # BLD AUTO: 1.44 10^3/UL1 (ref 1–4.8)
LYMPHOCYTES NFR BLD AUTO: 11.9 % (ref 24–44)
LYMPHOCYTES NFR BLD: 27 % (ref 25–36)
MCH RBC QN AUTO: 19.8 PG (ref 26–34)
MONOCYTES # BLD AUTO: 0.5 10^3/UL (ref 0.3–0.8)
MONOCYTES NFR BLD AUTO: 3.8 % (ref 5–12)
MONOCYTES NFR BLD: 1 % (ref 3–9)
NEUTROPHILS # BLD AUTO: 10.1 10^3/UL (ref 1.8–7.7)
NEUTROPHILS NFR BLD AUTO: 83.1 % (ref 41–85)
NEUTS BAND NFR BLD: 1 % (ref 2–6)
NEUTS SEG NFR BLD: 71 % (ref 31–76)
PLATELET # BLD AUTO: 376 10^3/UL (ref 150–400)
UROBILINOGEN UR QL STRIP.AUTO: 0.2 E.U./DL
YEAST URNS QL MICRO: (no result)

## 2022-09-21 PROCEDURE — 84703 CHORIONIC GONADOTROPIN ASSAY: CPT

## 2022-09-21 PROCEDURE — 36415 COLL VENOUS BLD VENIPUNCTURE: CPT

## 2022-09-21 PROCEDURE — 99284 EMERGENCY DEPT VISIT MOD MDM: CPT

## 2022-09-21 PROCEDURE — 74176 CT ABD & PELVIS W/O CONTRAST: CPT

## 2022-09-21 PROCEDURE — 80053 COMPREHEN METABOLIC PANEL: CPT

## 2022-09-21 PROCEDURE — 85025 COMPLETE CBC W/AUTO DIFF WBC: CPT

## 2022-09-21 PROCEDURE — 81001 URINALYSIS AUTO W/SCOPE: CPT

## 2022-09-21 PROCEDURE — 96372 THER/PROPH/DIAG INJ SC/IM: CPT

## 2022-09-21 PROCEDURE — 83690 ASSAY OF LIPASE: CPT

## 2022-09-21 PROCEDURE — 87086 URINE CULTURE/COLONY COUNT: CPT

## 2022-09-21 PROCEDURE — 85730 THROMBOPLASTIN TIME PARTIAL: CPT

## 2022-09-21 PROCEDURE — 85610 PROTHROMBIN TIME: CPT

## 2022-09-21 NOTE — NUR
ARRIVAL

PATIENT ARRIVED TO ED5 AMBULATORY, C/O URINARY URGENCY,FREQUENCY, AND 
SUPRAPUBIC PAIN TODAY, PATIENT IS CURRENTLY ON HER MENSES AND STATES THIS DOES 
NOT FEEL LIKE A NORMAL PERIOD, TOOK OVER THE COUNTER MEDICATIONS WITH MINIMAL 
RELIEF, UA COLLECTED, VITAL SIGNS TAKEN AND DOCTOR NOTIFIED OF PATIENT'S 
ARRIVAL.

## 2022-09-21 NOTE — ER.PDOC
General


Chief Complaint:  Abdomen Pain


Stated Complaint:  ABD PAIN


Time seen by MD:  13:40


Source:  patient


Exam Limitations:  no limitations





History of Present Illness


Initial Comments


Patient is a 37-year-old female past medical history of UTIs who comes in with a

right-sided flank pain over the past couple of days.  Patient states that she 

started her period about 2 days ago and thought that she was having period 

cramps that she was taking Midol without relief of pain.  Patient states that 

the pain is progressively gotten worse.Patient states that his crampy-like pain 

encounter radiates towards her groin.  Patient states she does not know what 

makes the pain better or worse.  With that the model really has not helped.  

Patient denies really any other symptoms no pain with urination no nausea 

vomiting no fevers.


Timing/Duration:  24 hours


Severity/Quality:  mild


Radiation:  RLQ


Associated Symptoms:  back pain


Exacerbated by:  nothing


Relieved By:  nothing


Allergies:  


Coded Allergies:  


     No Known Allergies (Unverified , 16)


Home Meds


Active Scripts


Hydrocodone Bit/Acetaminophen (NORCO 7.5-325 TABLET) 7.5-3,251 Ea Tablet, 2 EACH

PO Q4H PRN for Pain 7-10 when tolerating PO, #60 TABLET 0 Refills


   Prov:SPRING MCDUFFIE MD         16


Reported Medications


Pantoprazole Sodium (PROTONIX) 40 Mg Tablet.dr, 1 TAB PO DAILY, #30 TAB 5 

Refills


   11/8/15


Levothyroxine Sodium (SYNTHROID) 175 Mcg Tablet, 175 MCG PO DAILY for 

HYPOTHYROID, #30 TABLET 1 Refill


   14


Vital Signs





First Vital Signs








  Date Time  Temp Pulse Resp B/P (MAP) Pulse Ox O2 Delivery O2 Flow Rate FiO2


 


22 12:17 97.8 83 18  99   


 


22 12:17    158/88 (111)  Room Air* 0 21








Last Vital Signs








  Date Time  Temp Pulse Resp B/P (MAP) Pulse Ox O2 Delivery O2 Flow Rate FiO2


 


22 12:17 97.8 83 18 158/88 (111) 99 Room Air* 0 21











Past Medical History


Medical History:  thyroid disease, other


Surgical History:  





Family History


Significant Family History:  no pertinent family hx





Social History


Smoking:  non-smoker


Alcohol Use:  none


Drug Use:  none





Reviewed


Nursing Reviewed:  Vital Signs, Abn. Noted, Nursing Assessment





Constitutional:  denies no symptoms reported, denies see HPI, denies chills, 

denies diaphoresis, denies fever, denies malaise, denies weakness, denies other


EENTM:  denies no symptoms reported, denies see HPI, denies eye pain, denies 

blurred vision, denies tearing, denies double vision, denies ear pain, denies 

ear discharge, denies nose pain, denies nose congestion, denies throat pain, 

denies throat swelling, denies mouth pain, denies mouth swelling, denies other


Respiratory:  denies no symptoms reported, denies see HPI, denies cough, denies 

orthopnea, denies shortness of breath, denies SOB with exertion, denies SOB at 

rest, denies stridor, denies wheezing, denies other


Cardiovascular:  denies no symptoms reported, denies see HPI, denies chest pain,

denies edema, denies irregular heart rate, denies lightheadedness, denies 

palpitations, denies syncope, denies other


Gastrointestinal:  denies no symptoms reported, denies see HPI, denies abdomen 

distended; abdominal pain; denies blood streaked bowels, denies constipated, 

denies diarrhea, denies difficulty swallowing, denies nausea, denies poor miri

etite, denies poor fluid intake, denies rectal bleeding, denies vomiting, denies

other


Genitourinary:  pain


Musculoskeletal:  denies no symptoms reported, denies see HPI, denies back pain,

denies gout, denies joint pain, denies joint swelling, denies muscle pain, 

denies muscle stiffness, denies neck pain, denies other


Skin:  denies no symptoms reported, denies see HPI, denies change in color, 

denies change in hair/nails, denies dryness, denies lesions, denies lumps, 

denies rash, denies other


Psychiatric/Neurological:  denies no symptoms reported, denies see HPI, denies a

nxiety, denies depressed, denies emotional problems, denies headache, denies 

numbness, denies paresthesia, denies pre-existing deficit, denies seizure, 

denies tingling, denies tremors, denies weakness, denies other


Endocrine:  denies no symptoms reported, denies see HPI, denies excessive 

sweating, denies flushing, denies intolerance to cold, denies intolerance to 

heat, denies increased hunger, denies increased thrist, denies increased urine, 

denies unexplained weight gain, denies unexplaned weight loss, denies other


Hematologic/Lymphatic:  denies no symptoms reported, denies see HPI, denies 

anemia, denies blood clots, denies easy bleeding, denies easy bruising, denies 

swollen glands, denies other





Physical Exam


General Appearance:  No Apparent Distress, WD/WN


HEENT:  PERRL/EOMI, Normal ENT Inspection, TMs Normal, Pharynx Normal


Neck:  Non-Tender, Full Range of Motion, Supple, Normal Inspection


Respiratory:  chest non-tender, lungs clear, normal breath sounds, no 

respiratory distress, no accessory muscle use


Cardiovascular:  Normal Peripheral Pulses, Regular Rate, Rhythm, No Edema, No 

Gallop, No JVD, No Murmur


Gastrointestinal:  Normal Bowel Sounds, Soft, Tenderness (Suprapubic)


Back:  Normal Inspection, No CVA Tenderness, No Vertebral Tenderness


Extremities:  Normal Range of Motion, Non-Tender, Normal Inspection, No Pedal 

Edema, No Calf Tenderness, Normal Capillary Refill, Pelvis Stable


Neurologic/Psychiatric:  CNs II-XII NML as Tested, No Motor/Sensory Deficits, 

Alert, Normal Mood/Affect, Oriented x 3


Skin:  Normal Color, Warm/Dry


Lymphatic:  No Adenopathy





Results/Orders


Results/Orders





Orders - JORGE L WATT MD


Urinalysis (22 12:20)


Cbc With Auto Diff (22 12:30)


Comprehensive Metabolic Panel (22 12:30)


Lipase (22 12:30)


PT (22 12:30)


Partial Thromboplastin Time. (22 12:30)


Hcg Qualitative Serum (22 12:30)


Saline Lock (22 12:30)


Ct Abd/Pelvis Wo Iv Contrast (22 12:30)


Urine Culture (22 12:26)





Vital Signs








  Date Time  Temp Pulse Resp B/P (MAP) Pulse Ox O2 Delivery O2 Flow Rate FiO2


 


22 12:17 97.8 83 18 158/88 (111) 99 Room Air* 0 21


 


22 12:17 97.8 83 18     


 


22 12:17 97.8 83 18  99   








                                Laboratory Tests








Test


 22


12:26 22


12:47


 


Urine Collection Type UNKNOWN   


 


Urine Color YELLOW   


 


Urine Appearance CLOUDY   


 


Urine Bilirubin


 NEGATIVE


(NEGATIVE) 





 


Urine Ketones


 NEGATIVE


(NEGATIVE) 





 


Urine Specific Gravity


 1.025


(1.005-1.030) 





 


Urine pH 6.0 (4.5-8.0)   


 


Urine Protein


 NEGATIVE


(NEGATIVE) 





 


Urine Urobilinogen


 0.2 E.U./dL


(0.2) 





 


Urine Nitrate


 NEGATIVE


(NEGATIVE) 





 


Urine Leukocyte Esterase


 NEGATIVE


(NEGATIVE) 





 


Urine Glucose (Auto)(UA)


 NEGATIVE


(NEGATIVE) 





 


Urine Blood


 3+ (NEGATIVE)


H 





 


Urine RBC


 10-25 RBC/HPF


(NONE SEEN)  H 





 


Urine WBC


 0-2 WBC/HPF


(0-2) 





 


Urine Squamous Epithelial


Cells MODERATE


(<=FEW) 





 


Urine Bacteria


 MODERATE (NONE


SEEN)  H 





 


Urine Yeast


 FEW (NONE


SEEN) 





 


White Blood Count


 


 12.1 10^3/uL


(4.5-11.0)  H


 


Red Blood Count


 


 5.60 10^6/uL


(4.00-5.20)  H


 


Hemoglobin


 


 11.1 g/dL


(12.0-15.0)  L


 


Hematocrit


 


 39.7 %


(36.0-46.0)


 


Mean Corpuscular Volume


 


 70.9 fL


()  L


 


Mean Corpuscular Hemoglobin


 


 19.8 pg


(26-34)  L


 


Mean Corpuscular Hemoglobin


Concent 


 28.0 g/dL


(33-36.5)  L


 


Red Cell Distribution Width


 


 28.1 %


(11.5-14.5)  H


 


Platelet Count


 


 376 10^3/uL


(150-400)


 


Mean Platelet Volume


 


 9.8 fL


(7.8-11.0)


 


Neutrophils (%) (Auto)


 


 83.1 %


(41.0-85.0)


 


Lymphocytes (%) (Auto)


 


 11.9 %


(24.0-44.0)  L


 


Monocytes (%) (Auto)


 


 3.8 %


(5.0-12.0)  L


 


Neutrophils # (Auto)


 


 10.1 10^3/uL


(1.8-7.7)  H


 


Lymphocytes # (Auto)


 


 1.44 10^3/uL1


(1.0-4.8)


 


Monocytes # (Auto)


 


 0.5 10^3/uL


(0.3-0.8)


 


Absolute Immature Granulocyte


(auto 


 0.01 10^3 u/L


(0-2)


 


Absolute Eosinophils (auto)


 


 0.1 10^3/uL


(0.0-0.2)


 


Immature Granulocytes %


 


 0.10 %


(0.00-0.50)


 


Eosinophils %


 


 0.7 %


(0.0-5.0)


 


Basophils %


 


 0.4 %


(0.0-0.2)  H


 


Basophils #


 


 0.1 10^3/uL


(0.0-0.1)


 


Prothrombin Time


 


 9.6 SEC


(9.1-11.5)


 


Prothrombin Time INR


(Non-Therap) 


 0.9  





 


Activated Partial


Thromboplast Time 


 24.2 SEC


(22.5-33.1)


 


Sodium Level


 


 139 mmol/L


(132-145)


 


Potassium Level


 


 3.8 mmol/L


(3.6-5.2)


 


Chloride Level


 


 102.0 mmol/L


()


 


Carbon Dioxide Level


 


 23.3 mmol/L


(20.0-32)


 


Anion Gap  17.5  


 


Blood Urea Nitrogen


 


 14 mg/dL


(7-18)


 


Creatinine


 


 0.99 mg/dL


(0.59-1.40)


 


Estimated GFR (


American) 


 76.4 (>/=60)  





 


Est GFR (CKD-EPI)(Non-Afr


American) 


 63.1 (>/=60)  





 


BUN/Creatinine Ratio  14.0  


 


Glucose Level


 


 96 mg/dL


()


 


Calcium Level


 


 8.9 mg/dL


(8.4-10.5)


 


Total Bilirubin


 


 0.3 mg/dL


(0.2-1.0)


 


Aspartate Amino Transferase


(AST) 


 14 U/L (0-35)  





 


Alanine Aminotransferase (ALT)


 


 22 U/L (12-78)





 


Alkaline Phosphatase


 


 61 U/L


()


 


Total Protein


 


 7.9 g/dL


(6.4-8.2)


 


Albumin


 


 3.6 g/dL


(3.4-5.0)


 


Globulin  4.3  


 


Albumin/Globulin Ratio  0.837  


 


Lipase


 


 76 U/L


(114-286)  L


 


Serum HCG, Qualitative


 


 NEGATIVE


(NEGATIVE)











Progress


Progress


Patient here with abdominal pain could be kidney Stone.  Something with her 

pelvis we will obtain a CT scan will obtain labs will provide pain control as 

necessary.





[id number] possibly has inflamed cervix on CT scan urine appears 

dirty it is contaminated however patient has all the symptoms so we will 

continue to treat.  Patient voiced understanding need to follow-up with OB/GYN 

and when to return to the ER.





ER DEPART


Departure


Time of Disposition:  13:44


Disposition:  01 HOME / SELF CARE / HOMELESS


Impression:  


   Primary Impression:  


   UTI (urinary tract infection)


Condition:  Improved


Patient Instructions:  Urinary Tract Infection


Referrals:  


YUDITH GONSALVES MD (PCP)


PRIMARY CARE PROVIDER





Additional Instructions:  


As discussed please follow-up with your OB/GYN within the next week.  As well as

your primary care provider.  If you have any new persistent or worsening 

symptoms or concerns please seek medical attention.  Please take all medications

as prescribed.


Duration or Time Spent with Pa:  30





Problem Qualifiers








   Primary Impression:  


   UTI (urinary tract infection)


   Urinary tract infection type:  site unspecified  Hematuria presence:  with 

   hematuria  Qualified Codes:  N39.0 - Urinary tract infection, site not 

   specified; R31.9 - Hematuria, unspecified








JORGE L WATT MD               Sep 21, 2022 13:46

## 2022-09-21 NOTE — DIREP
PROCEDURE:CT ABDOMEN/PELVIS W/O CONTRAST

 

COMPARISON:None.

 

INDICATIONS:abd pain

 

TECHNIQUE:Axial images were created through the abdomen and pelvis without 

intravenous contrast material. 

No oral contrast was administered.

Sagittal and coronal reconstructions were performed from source images.

 

FINDINGS:

LUNG BASES:Normal.  No visible pulmonary or pleural disease.   

LIVER:Normal.  No significant liver lesions are identified.  

BILIARY:Normal.  No visible dilatation or calcification.  

PANCREAS:Normal.  No lesion, fluid collection, ductal dilatation, or atrophy.  

 

SPLEEN:Normal.  No enlargement or focal lesion. 

ADRENALS:Normal.  No mass or enlargement.  

URINARY TRACT:Normal.  No focal lesions or hydronephrosis.  No renal or 

ureteral calculi are seen.

AORTA/VASCULAR:Normal.  No aneurysm. 

RETROPERITONEUM:Normal.  No mass or adenopathy.  

BOWEL/MESENTERY:Normal.  There is no intestinal obstruction, free fluid, free 

air or mesenteric inflammatory changes.  Normal appearing appendix.  

ABDOMINAL WALL:A small fat containing periumbilical abdominal wall hernia is 

seen.  There is thinning of the anterior abdominal wall in midline in the 

periumbilical region.

PELVIC ORGANS:The cervix appears mildly prominent and heterogeneous in density 

on limited images without intravenous contrast.  No free fluid is seen in the 

pelvis.

BONES:Normal for age.  No bony lesion or acute fracture.  

OTHER:Negative.  

 

CONCLUSION:

1.  No acute abnormalities are seen in the abdomen and pelvis.

2.  The cervix appears mildly prominent in size and heterogeneous in density on 

limited images without intravenous contrast.  Recommend further evaluation with 

clinical correlation and direct visualization.  

 

 

Dictated by: Scott Brothers M.D.  On 09/21/2022 at 01:03 PM     

Electronically Signed By: Scott Brothers M.D. on 09/21/2022 at 01:11 PM

## 2022-11-29 ENCOUNTER — HOSPITAL ENCOUNTER (OUTPATIENT)
Dept: HOSPITAL 65 - RAD | Age: 37
Discharge: HOME | End: 2022-11-29
Attending: NURSE PRACTITIONER
Payer: MEDICAID

## 2022-11-29 DIAGNOSIS — N92.0: Primary | ICD-10-CM

## 2022-11-29 PROCEDURE — 76830 TRANSVAGINAL US NON-OB: CPT

## 2022-11-29 PROCEDURE — 76856 US EXAM PELVIC COMPLETE: CPT

## 2022-11-29 PROCEDURE — 93976 VASCULAR STUDY: CPT

## 2022-11-30 NOTE — DIREP
PROCEDURE:US PELVIC FOLLOWED BY TRANSVAGINAL

 

COMPARISON:[facility], CT, CT ABD/PELVIS W/O, 2022, 12:41 

PM.

 

INDICATIONS:N92.0 EXCESSIVE AND FREQUENT MENSTRUATION WITH REGULAR CYCLE

 

TECHNIQUE:Pelvic ultrasound using transabdominal technique.  Endovaginal 

images were also obtained for better assessment of the uterus and adnexa.

 

FINDINGS:

LMP:  2022

UTERUS:Size is 10.8 x 6.3 x 7.1 cm.  The myometrium is inhomogeneous.   

Ill-defined endometrial myometrial junction.  The uterus is slightly globular 

in shape which may be exaggerated by the  scar present.

ENDOMETRIUM:Thickness is 12 mm.  Evaluation of the endometrium is limited on 

the transvaginal images due to uterine position.  On a few images there are 

findings suspicious for an echogenic endometrial mass, measures 1.3 x 1.4 cm on 

image 24/53.  This could be an artifact related to background heterogeneity due 

to adenomyosis.  Alternatively, retained blood clot or polyp could be 

considered.  No color Doppler assessment of this area.

 

RIGHT OVARY:Not visualized due to bowel gas shadowing. No right adnexal mass 

demonstrated.

 

LEFT OVARY:Normal appearance.  4.3 x 3.3 x 2.9 cm. A cyst is identified 

measuring approximately 2.0 cm.  No internal septations or mural nodularity.

 

CUL-DE-SAC:Normal.

OTHER:Negative.

 

CONCLUSION:

1. Uterus:  Suspect adenomyosis.

2. Limited visualization of the endometrium.  Possible endometrial mass may be 

related to background adenomyosis, retained blood clot or polyp.

3. Left ovarian cyst:  Functional.

 

 

Dictated by: PAM Physician on 2022 at 05:48 PM     

Electronically Signed By: Vicky Warren MD on 2022 at 12:13 PM   

   

 

 

 

ac

## 2022-12-02 ENCOUNTER — HOSPITAL ENCOUNTER (OUTPATIENT)
Dept: HOSPITAL 65 - LAB | Age: 37
Discharge: HOME | End: 2022-12-02
Attending: NURSE PRACTITIONER
Payer: MEDICAID

## 2022-12-02 DIAGNOSIS — R25.2: ICD-10-CM

## 2022-12-02 DIAGNOSIS — E03.9: ICD-10-CM

## 2022-12-02 DIAGNOSIS — N92.0: Primary | ICD-10-CM

## 2022-12-02 LAB
ERYTHROCYTE [DISTWIDTH] IN BLOOD BY AUTOMATED COUNT: 16.3 % (ref 11.5–14.5)
MCH RBC QN AUTO: 22.2 PG (ref 26–34)
PLATELET # BLD AUTO: 372 10^3/UL (ref 150–400)

## 2022-12-02 PROCEDURE — 83735 ASSAY OF MAGNESIUM: CPT

## 2022-12-02 PROCEDURE — 85027 COMPLETE CBC AUTOMATED: CPT

## 2022-12-02 PROCEDURE — 36415 COLL VENOUS BLD VENIPUNCTURE: CPT

## 2022-12-02 PROCEDURE — 84443 ASSAY THYROID STIM HORMONE: CPT

## 2022-12-02 PROCEDURE — 84439 ASSAY OF FREE THYROXINE: CPT

## 2022-12-05 ENCOUNTER — HOSPITAL ENCOUNTER (OUTPATIENT)
Dept: HOSPITAL 65 - LAB | Age: 37
Discharge: HOME | End: 2022-12-05
Attending: NURSE PRACTITIONER
Payer: MEDICAID

## 2022-12-05 DIAGNOSIS — E03.9: Primary | ICD-10-CM

## 2022-12-05 PROCEDURE — 83970 ASSAY OF PARATHORMONE: CPT

## 2022-12-05 PROCEDURE — 36415 COLL VENOUS BLD VENIPUNCTURE: CPT

## 2022-12-05 PROCEDURE — 86256 FLUORESCENT ANTIBODY TITER: CPT

## 2022-12-05 PROCEDURE — 86235 NUCLEAR ANTIGEN ANTIBODY: CPT

## 2022-12-05 PROCEDURE — 86038 ANTINUCLEAR ANTIBODIES: CPT

## 2022-12-05 PROCEDURE — 86800 THYROGLOBULIN ANTIBODY: CPT

## 2022-12-05 PROCEDURE — 83036 HEMOGLOBIN GLYCOSYLATED A1C: CPT

## 2022-12-05 PROCEDURE — 86376 MICROSOMAL ANTIBODY EACH: CPT

## 2022-12-05 PROCEDURE — 84479 ASSAY OF THYROID (T3 OR T4): CPT

## 2022-12-05 PROCEDURE — 86225 DNA ANTIBODY NATIVE: CPT

## 2023-01-11 ENCOUNTER — HOSPITAL ENCOUNTER (OUTPATIENT)
Dept: HOSPITAL 65 - LAB | Age: 38
Discharge: HOME | End: 2023-01-11
Attending: NURSE PRACTITIONER
Payer: MEDICAID

## 2023-01-11 DIAGNOSIS — E03.9: Primary | ICD-10-CM

## 2023-01-11 PROCEDURE — 84443 ASSAY THYROID STIM HORMONE: CPT

## 2023-01-11 PROCEDURE — 36415 COLL VENOUS BLD VENIPUNCTURE: CPT

## 2023-01-11 PROCEDURE — 84439 ASSAY OF FREE THYROXINE: CPT
